# Patient Record
Sex: MALE | Race: WHITE
[De-identification: names, ages, dates, MRNs, and addresses within clinical notes are randomized per-mention and may not be internally consistent; named-entity substitution may affect disease eponyms.]

---

## 2019-01-18 ENCOUNTER — HOSPITAL ENCOUNTER (EMERGENCY)
Dept: HOSPITAL 80 - FED | Age: 29
Discharge: HOME | End: 2019-01-18
Payer: COMMERCIAL

## 2019-01-18 VITALS — DIASTOLIC BLOOD PRESSURE: 65 MMHG | SYSTOLIC BLOOD PRESSURE: 110 MMHG
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
## 2019-01-18 DIAGNOSIS — R55: ICD-10-CM
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
## 2019-01-18 DIAGNOSIS — S00.03XA: Primary | ICD-10-CM
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
## 2019-01-18 LAB — PLATELET # BLD: 194 10^3/UL (ref 150–400)

## 2019-01-18 NOTE — EDPHY
H & P


Time Seen by Provider: 01/18/19 19:21


HPI/ROS: 





CHIEF COMPLAINT:  Passed out twice





HISTORY OF PRESENT ILLNESS:  28-year-old man presents after having passed out 

twice today.  He worked out his usual 45 min high intensity interval workout 

before going to work, then went snowboarding between 2:30 and 4:00 p.m. he was 

standing at the bar at [place] [id number] and said "I think I am going to blackout

"and then proceeded to pass out and fall onto the floor.  He was only out for a 

2nd and no seizure activity in got up and then it happened again, he hit his 

head on the 2nd time.  He only had a smoothie and an orange today, did not have 

lunch or breakfast which isn't typical for him.


Right now he just has a little soreness on his head, but otherwise feels fine.  

No chest pain or shortness of breath.





REVIEW OF SYSTEMS:


Eye: no change in vision


ENT: no sore throat


Cardiac: no chest pain or syncope


Pulmonary: no cough or SOB


Abdomen: no vomiting, diarrhea, abdominal pain


Musculoskeletal: no back pain or neck pain


Skin: no rash


Neuro:  HPI no weakness or numbness in extremities


Constitutional: no fever


: no urinary symptoms





A comprehensive 10 point review of systems is otherwise negative aside from 

elements mentioned in the history of present illness.





PAST MEDICAL HISTORY:  Negative


Family history:  Negative for dysrhythmia or venous thromboembolism.


Social history:  A little bit of alcohol tonight





General Appearance: Alert and conversant, cooperative.


Eyes: No scleral  icterus.  Pupils equal reactive extraocular motion intact.


ENT, Mouth: Normal mucous membranes.  No hemotympanum or bruising behind the 

ear.  Less than 1 cm left parietal hematoma.


Respiratory: Normal respiratory effort, breath sounds equal, lungs are clear to 

auscultation.


Cardiovascular:  Regular rate and rhythm.  No murmur.


Gastrointestinal:  Abdomen is soft and non tender.


Neurological: Alert, face symmetric, normal motor and sensory in extremities.   

Speech fluent, no pronator drift, finger-to-nose normal bilaterally.


Skin: Warm and dry, no rashes.


Musculoskeletal:  No midline spinal tenderness.


Psychiatric: Not agitated.





Emergency Department course/MDM:





Much more likely to be vasovagal then dysrhythmia or seizure or venous 

thromboembolism or stroke.  Precipitating factor was likely also related to 

having very little to eat or drink today with extra exertion and exercise.


Does not have high risk red flags for his head injury.


Cervical spine cleared clinically.





IV fluids and food, discharge if labs are normal.  EKG is sinus rhythm.


Smoking Status: Never smoked


Constitutional: 


 Initial Vital Signs











Temperature (C)  36.7 C   01/18/19 19:08


 


Heart Rate  78   01/18/19 19:08


 


Respiratory Rate  16   01/18/19 19:08


 


Blood Pressure  111/69   01/18/19 19:08


 


O2 Sat (%)  94   01/18/19 19:08








 











O2 Delivery Mode               Room Air














Allergies/Adverse Reactions: 


 





No Known Allergies Allergy (Unverified 01/18/19 19:07)


 








Home Medications: 














 Medication  Instructions  Recorded


 


NK [No Known Home Meds]  01/18/19














Medical Decision Making





- Diagnostics


EKG Interpretation: 





12-lead EKG interpreted by me; official reading is in computer system.  My 

interpretation is sinus rhythm rate 66, otherwise normal.





- Data Points


Laboratory Results: 


 Laboratory Results





 01/18/19 19:20 





 01/18/19 19:20 





 











  01/18/19 01/18/19 01/18/19





  19:24 19:20 19:20


 


WBC      7.17 10^3/uL 10^3/uL





     (3.80-9.50) 


 


RBC      4.81 10^6/uL 10^6/uL





     (4.40-6.38) 


 


Hgb      15.8 g/dL g/dL





     (13.7-17.5) 


 


Hct      44.6 % %





     (40.0-51.0) 


 


MCV      92.7 fL fL





     (81.5-99.8) 


 


MCH      32.8 pg pg





     (27.9-34.1) 


 


MCHC      35.4 g/dL g/dL





     (32.4-36.7) 


 


RDW      12.0 % %





     (11.5-15.2) 


 


Plt Count      194 10^3/uL 10^3/uL





     (150-400) 


 


MPV      10.8 fL fL





     (8.7-11.7) 


 


Neut % (Auto)      50.1 % %





     (39.3-74.2) 


 


Lymph % (Auto)      41.3 % %





     (15.0-45.0) 


 


Mono % (Auto)      7.0 % %





     (4.5-13.0) 


 


Eos % (Auto)      0.8 % %





     (0.6-7.6) 


 


Baso % (Auto)      0.7 % %





     (0.3-1.7) 


 


Nucleat RBC Rel Count      0.0 % %





     (0.0-0.2) 


 


Absolute Neuts (auto)      3.59 10^3/uL 10^3/uL





     (1.70-6.50) 


 


Absolute Lymphs (auto)      2.96 10^3/uL 10^3/uL





     (1.00-3.00) 


 


Absolute Monos (auto)      0.50 10^3/uL 10^3/uL





     (0.30-0.80) 


 


Absolute Eos (auto)      0.06 10^3/uL 10^3/uL





     (0.03-0.40) 


 


Absolute Basos (auto)      0.05 10^3/uL 10^3/uL





     (0.02-0.10) 


 


Absolute Nucleated RBC      0.00 10^3/uL 10^3/uL





     (0-0.01) 


 


Immature Gran %      0.1 % %





     (0.0-1.1) 


 


Immature Gran #      0.01 10^3/uL 10^3/uL





     (0.00-0.10) 


 


Sodium    136 mEq/L mEq/L  





    (135-145)  


 


Potassium    3.9 mEq/L mEq/L  





    (3.5-5.2)  


 


Chloride    102 mEq/L mEq/L  





    ()  


 


Carbon Dioxide    23 mEq/l mEq/l  





    (22-31)  


 


Anion Gap    11 mEq/L mEq/L  





    (6-14)  


 


BUN    19 mg/dL mg/dL  





    (7-23)  


 


Creatinine    1.1 mg/dL mg/dL  





    (0.7-1.3)  


 


Estimated GFR    > 60   





    


 


Glucose    133 mg/dL H mg/dL  





    ()  


 


Calcium    9.6 mg/dL mg/dL  





    (8.5-10.4)  


 


POC Troponin I  0.00 ng/mL ng/mL    





   (0.00-0.08)   











Medications Given: 


 








Discontinued Medications





Sodium Chloride (Ns)  1,000 mls @ 0 mls/hr IV EDNOW ONE; Wide Open


   PRN Reason: Protocol


   Stop: 01/18/19 19:26


   Last Admin: 01/18/19 19:39 Dose:  1,000 mls





Point of Care Test Results: 


 Chemistry











  01/18/19





  19:24


 


POC Troponin I  0.00 ng/mL ng/mL





   (0.00-0.08) 














Departure





- Departure


Disposition: Home, Routine, Self-Care


Clinical Impression: 


Syncope


Qualifiers:


 Syncope type: vasovagal syncope Qualified Code(s): R55 - Syncope and collapse





Condition: Good


Instructions:  Syncope (ED)


Additional Instructions: 


Your labs were normal which included electrolytes, kidney function, red blood 

cell count, and blood sugar.


Referrals: 


NONE *PRIMARY CARE P,. [Primary Care Provider] - As per Instructions (Shiraz COHEN)

## 2019-01-18 NOTE — CPEKG
Test Reason : OPEN

Blood Pressure : ***/*** mmHG

Vent. Rate : 066 BPM     Atrial Rate : 067 BPM

   P-R Int : 167 ms          QRS Dur : 099 ms

    QT Int : 392 ms       P-R-T Axes : 043 012 011 degrees

   QTc Int : 411 ms

 

Sinus rhythm

 

Confirmed by Dylan Soler ([id number]) on 1/18/2019 7:46:26 PM

 

Referred By:             Confirmed By:Dylan Soler